# Patient Record
Sex: FEMALE | Race: BLACK OR AFRICAN AMERICAN
[De-identification: names, ages, dates, MRNs, and addresses within clinical notes are randomized per-mention and may not be internally consistent; named-entity substitution may affect disease eponyms.]

---

## 2019-04-21 ENCOUNTER — HOSPITAL ENCOUNTER (EMERGENCY)
Dept: HOSPITAL 72 - EMR | Age: 64
Discharge: HOME | End: 2019-04-21
Payer: MEDICAID

## 2019-04-21 VITALS — WEIGHT: 177 LBS | BODY MASS INDEX: 31.36 KG/M2 | HEIGHT: 63 IN

## 2019-04-21 VITALS — DIASTOLIC BLOOD PRESSURE: 80 MMHG | SYSTOLIC BLOOD PRESSURE: 143 MMHG

## 2019-04-21 VITALS — DIASTOLIC BLOOD PRESSURE: 76 MMHG | SYSTOLIC BLOOD PRESSURE: 136 MMHG

## 2019-04-21 DIAGNOSIS — Z88.6: ICD-10-CM

## 2019-04-21 DIAGNOSIS — G62.9: ICD-10-CM

## 2019-04-21 DIAGNOSIS — T14.8XXA: ICD-10-CM

## 2019-04-21 DIAGNOSIS — R07.9: Primary | ICD-10-CM
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
## 2019-04-21 DIAGNOSIS — M54.6: ICD-10-CM
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
## 2019-04-21 DIAGNOSIS — M79.7: ICD-10-CM

## 2019-04-21 PROCEDURE — 99283 EMERGENCY DEPT VISIT LOW MDM: CPT

## 2019-04-21 PROCEDURE — 96372 THER/PROPH/DIAG INJ SC/IM: CPT

## 2019-04-21 PROCEDURE — 93005 ELECTROCARDIOGRAM TRACING: CPT

## 2019-04-21 NOTE — EMERGENCY ROOM REPORT
History of Present Illness


General


Chief Complaint:  Chest Pain


Source:  Patient





Present Illness


HPI


63-year-old female presents ED for evaluation.  Complaining of chest wall pain 

and upper back pain 1 day.  States she woke up with this pain.  Pain is dull, 

7 out of 10, radiating to the back.  Worse with movement.  Denies shortness of 

breath.  States she has history of fibromyalgia and neuropathy.  Denies 

hypertension or diabetes.  Denies smoking or drug use.  No other aggravating 

relieving factors.  Denies any other associated symptoms


Allergies:  


Coded Allergies:  


     ACETAMINOPHEN (Verified  Allergy, Unknown, 4/21/19)


     ASPIRIN (Verified  Allergy, Unknown, 4/21/19)


     HYDROCODONE (Verified  Allergy, Unknown, 4/21/19)





Patient History


Past Medical History:  other - fibromyalgia, neuropathy


Past Surgical History:  none


Pertinent Family History:  none


Social History:  Denies: smoking, alcohol use, drug use


Pregnant Now:  No


Immunizations:  UTD


Reviewed Nursing Documentation:  PMH: Agreed; PSxH: Agreed





Nursing Documentation-PMH


Past Medical History:  No History, Except For





Review of Systems


All Other Systems:  negative except mentioned in HPI





Physical Exam





Vital Signs








  Date Time  Temp Pulse Resp B/P (MAP) Pulse Ox O2 Delivery O2 Flow Rate FiO2


 


4/21/19 17:23 98.6 77 18 135/82 98 Room Air  








Sp02 EP Interpretation:  reviewed, normal


General Appearance:  no apparent distress, alert, GCS 15, non-toxic


Head:  normocephalic, atraumatic


Eyes:  bilateral eye normal inspection, bilateral eye PERRL


ENT:  hearing grossly normal, normal pharynx, no angioedema, normal voice


Neck:  full range of motion, supple/symm/no masses


Respiratory:  lungs clear, normal breath sounds, speaking full sentences, other 

- reproducible L anterior chest wall pain


Cardiovascular #1:  regular rate, rhythm, no edema


Cardiovascular #2:  2+ carotid (R), 2+ carotid (L), 2+ radial (R), 2+ radial (L)

, 2+ dorsalis pedis (R), 2+ dorsalis pedis (L)


Gastrointestinal:  normal bowel sounds, non tender, soft, non-distended, no 

guarding, no rebound


Rectal:  deferred


Genitourinary:  normal inspection, no CVA tenderness, no vertebral tenderness


Musculoskeletal:  gait/station normal, normal range of motion, tender - 

reproducible L upper back pain medial to shoulder blade.


Neurologic:  alert, oriented x3, responsive, motor strength/tone normal, 

sensory intact, speech normal


Psychiatric:  judgement/insight normal, memory normal, mood/affect normal, no 

suicidal/homicidal ideation


Reflexes:  3+ bicep (R), 3+ bicep (L), 3+ tricep (R), 3+ tricep (L), 3+ knee (R)

, 3+ knee (L)


Skin:  normal color, no rash, warm/dry, well hydrated


Lymphatic:  no adenopathy





Medical Decision Making


Diagnostic Impression:  


 Primary Impression:  


 Muscle strain


ER Course


Hospital Course 


63-year-old female presents ED complaining of reproducible chest wall pain. L 

upper back pain  





Differential diagnoses include: Rib fracture, MI/unstable angina, contusion, 

muscle strain





Clinical course


Patient placed on stretcher.  After initial history, physical exam reveals a 

middle-aged female in no acute distress.  There is some reproducible left sided 

chest wall pain.  Also reproducible pain to the left upper back just medial 

shoulder blade.  Some pain as well to the left shoulder but there is full range 

of motion.





Lungs clear.  No crepitus or bruising.  Remainder of exam unremarkable.





EKGnormal sinus rhythm no acute ischemic changes interpreted by me





I ordered Toradol, Robaxin, Lidoderm patch.





Discussed findings with patient.  clinical findings consistent with muscle 

strain. Reassurance given.  Patient has no cardiac risk factors.  Vital stable.

  Normal EKG.  I do not believe further workup is required at this time.  Pain 

improved after medications administered.





Safe for discharge and close outpatient follow-up.  States she has PMD





I.  I feel this is a highly complex case requiring extensive working including 

EKG/Rhythm strip, Xray/CT/US, Blood/urine lab work, repeat exams while in ED, 

and administration of strong opiates/narcotics for pain control, admission to 

hospital or close patient follow up.  





Diagnosis - muscle strain 





Stable and discharged to home with prescription for robaxin, lidoderm.  

Instructed to followup with PMD.  Return to ED if symptoms recur or worsen


EKG Diagnostic Results


Rate:  normal


Rhythm:  NSR


ST Segments:  no acute changes


ASA given to the pt in ED:  No





Rhythm Strip Diag. Results


EP Interpretation:  yes


Rhythm:  NSR, no PVC's, no ectopy





Last Vital Signs








  Date Time  Temp Pulse Resp B/P (MAP) Pulse Ox O2 Delivery O2 Flow Rate FiO2


 


4/21/19 17:39 98.4 77 22 143/80 99 Room Air  








Status:  improved


Disposition:  HOME, SELF-CARE


Condition:  Stable


Scripts


Lidocaine (Lidoderm) 1 Each Adh..patch


1 PATCH TOPIC DAILY, #7 PATCH 0 Refills


   Patch(es) may remain in place for up to 12 hours in any 24-hour


   period.


   Prov: Roby Goldman MD         4/21/19 


Methocarbamol* (ROBAXIN-750*) 750 Mg Tablet


750 MG PO TID, #21 TAB 0 Refills


   Prov: Roby Goldman MD         4/21/19


Referrals:  


[facility],REFERR[facility] (PCP)


Patient Instructions:  Chest Wall Pain, Easy-to-Read











Roby Goldman MD Apr 21, 2019 19:43

## 2019-04-21 NOTE — NUR
ED Nurse Note:



PT LAYING PEACEFULLY IN BED IN NAD. AOX4. PRESCRIPTION AND DISCHARGE PAPERWORK 
EXPLAINED TO PT. PT VERBALIZES UNDERSTANDING AND ALL QUESTIONS ANSWERED. 
PRESCRIPTION AND DISCHARGE PAPERWORK GIVEN TO PT AND ID WRISTBAND REMOVED. PT 
WALKED OUT OF ER WITH STEADY GAIT AND ALL BELONGINGS.

## 2019-04-21 NOTE — NUR
ED Nurse Note:



PT WALKED IN TO ER TODAY FROM HOME. AOX4. PT C/O LEFT SIDED CHEST PAIN, 10/10 
RADIATING TO LOWER BACK X 2 DAYS AGO. PT DENIES HEADACHE, NAUSEA, OR VOMITING. 
SKIN DRY. GAIT STEADY. /80 AT BEDSIDE WITH NORMAL SINUS RHYTHM ON CARDIAC 
MONITOR.

## 2019-04-22 NOTE — CARDIOLOGY REPORT
--------------- APPROVED REPORT --------------





EKG Measurement

Heart [id number]

OR [id number]

[id number]

[id number]

[id number]





Normal sinus rhythm

Possible Left atrial enlargement

Borderline ECG